# Patient Record
Sex: MALE | Race: ASIAN | NOT HISPANIC OR LATINO | URBAN - METROPOLITAN AREA
[De-identification: names, ages, dates, MRNs, and addresses within clinical notes are randomized per-mention and may not be internally consistent; named-entity substitution may affect disease eponyms.]

---

## 2023-02-21 ENCOUNTER — EMERGENCY (EMERGENCY)
Facility: HOSPITAL | Age: 23
LOS: 1 days | Discharge: HOME | End: 2023-02-21
Attending: STUDENT IN AN ORGANIZED HEALTH CARE EDUCATION/TRAINING PROGRAM
Payer: COMMERCIAL

## 2023-02-21 VITALS
HEART RATE: 91 BPM | TEMPERATURE: 98 F | SYSTOLIC BLOOD PRESSURE: 114 MMHG | WEIGHT: 199.96 LBS | HEIGHT: 75 IN | RESPIRATION RATE: 20 BRPM | DIASTOLIC BLOOD PRESSURE: 69 MMHG | OXYGEN SATURATION: 96 %

## 2023-02-21 VITALS
SYSTOLIC BLOOD PRESSURE: 114 MMHG | TEMPERATURE: 99 F | DIASTOLIC BLOOD PRESSURE: 82 MMHG | RESPIRATION RATE: 18 BRPM | OXYGEN SATURATION: 98 % | HEART RATE: 88 BPM

## 2023-02-21 LAB
EBV EA AB SER IA-ACNC: <5 U/ML — SIGNIFICANT CHANGE UP
EBV EA AB TITR SER IF: POSITIVE
EBV EA IGG SER-ACNC: NEGATIVE — SIGNIFICANT CHANGE UP
EBV NA IGG SER IA-ACNC: >600 U/ML — HIGH
EBV PATRN SPEC IB-IMP: SIGNIFICANT CHANGE UP
EBV VCA IGG AVIDITY SER QL IA: NEGATIVE — SIGNIFICANT CHANGE UP
EBV VCA IGM SER IA-ACNC: 11 U/ML — SIGNIFICANT CHANGE UP
EBV VCA IGM SER IA-ACNC: <10 U/ML — SIGNIFICANT CHANGE UP
EBV VCA IGM TITR FLD: NEGATIVE — SIGNIFICANT CHANGE UP
RAPID RVP RESULT: SIGNIFICANT CHANGE UP
S PYO AG SPEC QL IA: NEGATIVE — SIGNIFICANT CHANGE UP
S PYO DNA THROAT QL NAA+PROBE: SIGNIFICANT CHANGE UP
SARS-COV-2 RNA SPEC QL NAA+PROBE: SIGNIFICANT CHANGE UP

## 2023-02-21 PROCEDURE — 0225U NFCT DS DNA&RNA 21 SARSCOV2: CPT

## 2023-02-21 PROCEDURE — 87651 STREP A DNA AMP PROBE: CPT

## 2023-02-21 PROCEDURE — 99285 EMERGENCY DEPT VISIT HI MDM: CPT | Mod: 25

## 2023-02-21 PROCEDURE — 86664 EPSTEIN-BARR NUCLEAR ANTIGEN: CPT

## 2023-02-21 PROCEDURE — 86663 EPSTEIN-BARR ANTIBODY: CPT

## 2023-02-21 PROCEDURE — 99053 MED SERV 10PM-8AM 24 HR FAC: CPT

## 2023-02-21 PROCEDURE — 70360 X-RAY EXAM OF NECK: CPT | Mod: 26

## 2023-02-21 PROCEDURE — 70360 X-RAY EXAM OF NECK: CPT

## 2023-02-21 PROCEDURE — 36415 COLL VENOUS BLD VENIPUNCTURE: CPT

## 2023-02-21 PROCEDURE — 87081 CULTURE SCREEN ONLY: CPT

## 2023-02-21 PROCEDURE — 86665 EPSTEIN-BARR CAPSID VCA: CPT

## 2023-02-21 PROCEDURE — 87798 DETECT AGENT NOS DNA AMP: CPT

## 2023-02-21 PROCEDURE — 87880 STREP A ASSAY W/OPTIC: CPT

## 2023-02-21 PROCEDURE — 93005 ELECTROCARDIOGRAM TRACING: CPT

## 2023-02-21 PROCEDURE — 99284 EMERGENCY DEPT VISIT MOD MDM: CPT

## 2023-02-21 RX ORDER — LIDOCAINE 4 G/100G
10 CREAM TOPICAL ONCE
Refills: 0 | Status: COMPLETED | OUTPATIENT
Start: 2023-02-21 | End: 2023-02-21

## 2023-02-21 RX ORDER — ACETAMINOPHEN 500 MG
975 TABLET ORAL ONCE
Refills: 0 | Status: COMPLETED | OUTPATIENT
Start: 2023-02-21 | End: 2023-02-21

## 2023-02-21 RX ORDER — DEXAMETHASONE 0.5 MG/5ML
6 ELIXIR ORAL ONCE
Refills: 0 | Status: COMPLETED | OUTPATIENT
Start: 2023-02-21 | End: 2023-02-21

## 2023-02-21 RX ORDER — IBUPROFEN 200 MG
600 TABLET ORAL ONCE
Refills: 0 | Status: COMPLETED | OUTPATIENT
Start: 2023-02-21 | End: 2023-02-21

## 2023-02-21 RX ORDER — DEXAMETHASONE 0.5 MG/5ML
20 ELIXIR ORAL ONCE
Refills: 0 | Status: DISCONTINUED | OUTPATIENT
Start: 2023-02-21 | End: 2023-02-21

## 2023-02-21 RX ADMIN — Medication 600 MILLIGRAM(S): at 08:17

## 2023-02-21 RX ADMIN — Medication 975 MILLIGRAM(S): at 08:16

## 2023-02-21 RX ADMIN — LIDOCAINE 10 MILLILITER(S): 4 CREAM TOPICAL at 08:17

## 2023-02-21 RX ADMIN — Medication 6 MILLIGRAM(S): at 08:18

## 2023-02-21 RX ADMIN — Medication 975 MILLIGRAM(S): at 09:00

## 2023-02-21 RX ADMIN — Medication 600 MILLIGRAM(S): at 09:00

## 2023-02-21 NOTE — ED PROVIDER NOTE - CLINICAL SUMMARY MEDICAL DECISION MAKING FREE TEXT BOX
21 y/o male w/o PMH/PSH, NKDA c/o 1 day history of localized constant throat pain and dysphagia. Able to tolerate PO intake and secretions. Pt denies SOB, despite triage note. Admits to subjectives fevers and chills. CENTOR criteria positive for absence of cough, fevers, and tonsillar swelling. Will obtain rapid strep and treat if positive. Will obtain RVP and EBV sample. No signs of airway compromise at this time. Neck X-ray to screen for signs of epiglottitis. Will likely treat if strep is positive, and reassess w/ close PCP f/u. 23 y/o male w/o PMH/PSH, NKDA c/o 1 day history of localized constant throat pain and dysphagia. Able to tolerate PO intake and secretions. Pt denies SOB, despite triage note. Admits to subjectives fevers and chills. CENTOR criteria positive for absence of cough, fevers, and tonsillar swelling. Will obtain rapid strep and treat if positive. Will obtain RVP and EBV sample. No signs of airway compromise at this time. Neck X-ray to screen for signs of epiglottitis. Will likely treat if strep is positive, and reassess w/ close PCP f/u.    dianet attending- see attending attestation for my mdm

## 2023-02-21 NOTE — ED PROVIDER NOTE - NSFOLLOWUPINSTRUCTIONS_ED_ALL_ED_FT
Strep Throat in Children     Your child was seen in the Emergency Department and diagnosed with Strep Throat.    Strep throat is an infection in the throat that is caused by bacteria.  It is common in children who are 5-15 years old; however, people of all ages can get it.  The infection spreads from person to person (it is contagious) through coughing, sneezing, or close contact.      The condition is diagnosed by tests that check for the bacteria that cause strep throat.  The tests are:  -Rapid Strep test (ready in minutes)  -Throat culture test (ready in 1- 2 days)    General tips for taking care of a child who has strep throat:  -Take antibiotics as prescribed.  -Treat pain or fever with ibuprofen or acetaminophen  -Can also have your child gargle with a salt-water mixture 3-4 times a day or as needed (dissolve 0.5-1 teaspoon of salt in 1 cup of warm water)  -Use throat lozenges if your child is 3 years of age or older  -Give plenty of fluids to keep your child hydrated  -Keep your child at home and away from school or work until he or she has taken an antibiotic for 24 hours.    Follow up with your pediatrician in 1-2 days to make sure that your child is doing better.    Return to the Emergency Department if your child:  -has new symptoms, such as vomiting, severe headache, stiff or painful neck, chest pain, or shortness of breath  -has severe throat pain, drooling, or changes in his or her voice  -has swelling of the neck, or the skin on the neck becomes red and tender  -becomes increasingly sleepy Viral Respiratory Infection    A viral respiratory infection is an illness that affects parts of the body used for breathing, like the lungs, nose, and throat. It is caused by a germ called a virus. Symptoms can include runny nose, coughing, sneezing, fatigue, body aches, sore throat, fever, or headache. Over the counter medicine can be used to manage the symptoms but the infection typically goes away on its own in 5 to 10 days.     SEEK IMMEDIATE MEDICAL CARE IF YOU HAVE ANY OF THE FOLLOWING SYMPTOMS: shortness of breath, chest pain, fever over 10 days, or lightheadedness/dizziness.    Take ibuprofen 600mg every 8 hours as needed for your pain.     The results of any blood tests and imaging studies completed during your visit today were discussed and explained to you and a copy provided with your discharge instructions. Please follow up with your primary care doctor within 48 hours.

## 2023-02-21 NOTE — ED PROVIDER NOTE - PHYSICAL EXAMINATION
GENERAL: NAD  HEENT:  Atraumatic. Tonsillar erythema and enlargement bilaterally, no exudates.   CHEST/LUNG: Chest rise equal bilaterally  HEART: Regular rate and rhythm  ABDOMEN: Soft, Nontender, Nondistended  EXTREMITIES:  Extremities warm  PSYCH: A&Ox3  SKIN: No obvious rashes or lesions  NEUROLOGY: strength and sensation intact in all extremities. Ambulatory without difficulty.

## 2023-02-21 NOTE — ED PROVIDER NOTE - OBJECTIVE STATEMENT
21 y/o male w/o PMH/PSH, NKDA c/o 1 day history of localized constant throat pain and dysphagia. Able to tolerate PO intake and secretions. Pt denies SOB, despite triage note. Admits to subjectives fevers and chills. Denies nausea, vomiting, dizziness, chest pain, SOB, abdominal pain, dysuria, hematuria. 21 y/o male w/o PMH/PSH, NKDA c/o 1 day history of localized constant throat pain and dysphagia. Able to tolerate PO intake and secretions. Pt denies SOB, despite triage note. Admits to subjective fevers and chills. Denies nausea, vomiting, dizziness, chest pain, SOB, abdominal pain, dysuria, hematuria.

## 2023-02-21 NOTE — ED ADULT NURSE NOTE - OBJECTIVE STATEMENT
Patient  is alert  and  oriented x3. Color is good and skin warm to touch. He  is  c/o SOB and  sore throat. Patient has been afebrile.

## 2023-02-21 NOTE — ED PROVIDER NOTE - ATTENDING CONTRIBUTION TO CARE
I, Tony Stern, performed a history and physical exam of the patient and discussed their management with the resident and/or advanced care provider. I reviewed the resident and/or advanced care provider's note and agree with the documented findings and plan of care. I was present and available for all procedures.    21 y/o male w/o PMH/PSH, NKDA c/o 1 day history of localized constant throat pain and dysphagia. Able to tolerate PO intake and secretions. Pt denies SOB, despite triage note. Admits to subjective fevers and chills. Denies nausea, vomiting, dizziness, chest pain, SOB, abdominal pain, dysuria, hematuria.    Well appearing and in NAD, head normal appearing atraumatic, Head: no scalp abnormalities, no signs of basilar skull fracture  Eyes: Acuity 20/20 (R); 20/20 (L), Visual fields are Full; eyes are aligned, lids, conjunctivae and sclera are normal; pupils are 3mm and equal; brisk response to light; extraocular movements intact  Ears: Outer ear without lesions, normal acuity  Nose/Sinuses: Nasal mucosa non-inflamed, Nasal Septum midline, no tenderness over maxillary or frontal sinuses, no crepitus over any facial sinuses  Mouth: Normal lips, tongue, gums and teeth, pharynx is erythematous, tonsils normal sized and without exudates, uvula is midline, moist mucous membranes   Neck: Nontender bilateral tonsilar and anterior cervical nodes, no occipital, auricular, submandibular, submental or supraclavicular nodes, trachea in midline, thyroid lobes not palpable, no crepitus trachea midline, no respiratory distress, lungs cta bilaterally, rrr no murmurs, soft NT ND abdomen, no visible extremity deformities, Alert and oriented, non focal neuro exam, skin warm and dry, normal affect and mood    likely tonsillitis unlikely rpa pta epiglottitis or bact trach, strep swab ebv, sx relief no drooling or stridor discussed with patient agreed w plan for patel and rx. dispo likely home w fu and return prec

## 2023-02-21 NOTE — ED PROVIDER NOTE - NS ED ROS FT
GENERAL: + fever or chills  EYES: no change in vision   HEENT: no trouble swallowing or speaking. +Dysphagia  CARDIAC: no chest pain   PULMONARY: no cough or SOB  GI:  No abdominal pain  : No changes in urination   SKIN: no rashes   NEURO: no headache   MSK: No joint pain     All other ROS negative unless otherwise specified in HPI.

## 2023-02-21 NOTE — ED PROVIDER NOTE - PATIENT PORTAL LINK FT
You can access the FollowMyHealth Patient Portal offered by [facility] by registering at the following website: [email or web address]. By joining [facility]’s FollowMyHealth portal, you will also be able to view your health information using other applications (apps) compatible with our system.